# Patient Record
Sex: MALE | Race: WHITE
[De-identification: names, ages, dates, MRNs, and addresses within clinical notes are randomized per-mention and may not be internally consistent; named-entity substitution may affect disease eponyms.]

---

## 2019-09-15 ENCOUNTER — HOSPITAL ENCOUNTER (EMERGENCY)
Dept: HOSPITAL 56 - MW.ED | Age: 59
Discharge: HOME | End: 2019-09-15
Payer: COMMERCIAL

## 2019-09-15 DIAGNOSIS — S61.412A: Primary | ICD-10-CM

## 2019-09-15 DIAGNOSIS — Z79.899: ICD-10-CM
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
## 2019-09-15 DIAGNOSIS — I10: ICD-10-CM

## 2019-09-15 DIAGNOSIS — Z23: ICD-10-CM

## 2019-09-15 DIAGNOSIS — Z79.82: ICD-10-CM

## 2019-09-15 PROCEDURE — 90715 TDAP VACCINE 7 YRS/> IM: CPT

## 2019-09-15 PROCEDURE — 99282 EMERGENCY DEPT VISIT SF MDM: CPT

## 2019-09-15 PROCEDURE — 90471 IMMUNIZATION ADMIN: CPT

## 2019-09-15 NOTE — EDM.PDOC
ED HPI GENERAL MEDICAL PROBLEM





- General


Chief Complaint: Laceration


Stated Complaint: CUT ON HAND


Time Seen by Provider: 09/15/19 10:46


Source of Information: Reports: Patient


History Limitations: Reports: No Limitations





- History of Present Illness


INITIAL COMMENTS - FREE TEXT/NARRATIVE: 


History of present illness:


[]Patient cut his left hand with a  hour prior to arrival.





Review of systems: 


As per history of present illness and below otherwise all systems reviewed and 

negative.





Past medical history: 


As per history of present illness and as reviewed below otherwise 

noncontributory.





Surgical history: 


As per history of present illness and as reviewed below otherwise 

noncontributory.





Social history: 


No reported history of drug or alcohol abuse.





Family history: 


As per history of present illness and as reviewed below otherwise 

noncontributory.





Physical exam:


General: Well developed, well nourished in NAD


HEENT: Atraumatic, normocephalic, pupils reactive, negative for conjunctival 

pallor or scleral icterus, mucous membranes moist, throat clear, neck supple, 

nontender, trachea midline.


Lungs: Clear to auscultation, breath sounds equal bilaterally, chest nontender.


Heart: S1S2, regular, negative for clicks, rubs, or JVD.


Abdomen: NABS, Soft, nondistended, nontender. Negative for masses or 

hepatosplenomegaly. Negative for costovertebral tenderness.


Pelvis: Stable nontender.


Genitourinary: Deferred.


Rectal: Deferred.


Extremities: 5 cm laceration to the thenar eminence left hand, negative for 

cords or calf pain. Neurovascular unremarkable.


Neuro: Awake, alert, oriented. Cranial nerves II through XII unremarkable. 

Cerebellum unremarkable. Motor and sensory unremarkable throughout. Exam 

nonfocal.


Skin:warm and dry





Diagnostics:


None





Therapeutics:


Tetanus





ED Course:


Stable





Impression: 


Left hand laceration





Prescriptions:


None





Plan:


Take meds as directed, follow up with your primary care physician, return to ER 

if symptoms worsen or change.





Definitive disposition and diagnosis as appropriate pending reevaluation and 

review of above.





  ** Left hand


Pain Score (Numeric/FACES): 1





- Related Data


 Allergies











Allergy/AdvReac Type Severity Reaction Status Date / Time


 


No Known Allergies Allergy   Verified 09/15/19 10:55











Home Meds: 


 Home Meds





Aspirin 81 mg PO DAILY 09/15/19 [History]


Carvedilol [Coreg] 12.5 mg PO BID 09/15/19 [History]


Hydrochlorothiazide [Microzide] 12.5 mg PO DAILY 09/15/19 [History]


Omeprazole Magnesium [Prilosec Otc] 1 tab PO DAILY 09/15/19 [History]


Pravastatin Sodium [Pravachol] 80 mg PO DAILY 09/15/19 [History]


Ramipril [Altace] 10 mg PO DAILY 09/15/19 [History]











Past Medical History


Cardiovascular History: Reports: Bypass, Hypertension


Other Cardiovascular History: Patient states he has heart disease


Other Genitourinary History: Patient states he has "prostate issues."





- Infectious Disease History


Infectious Disease History: Reports: Chicken Pox





Social & Family History





- Family History


Family Medical History: Noncontributory





- Tobacco Use


Smoking Status *Q: Never Smoker





- Caffeine Use


Caffeine Use: Reports: None





- Recreational Drug Use


Recreational Drug Use: No





ED ROS GENERAL





- Review of Systems


Review Of Systems: See Below





ED EXAM, SKIN/RASH


Exam: See Below





ED SKIN PROCEDURES





- Laceration/Wound Repair


  ** Hand


Appearance: Superficial, Subcutaneous


Anesthetic Type: Local


Local Anesthesia - Lidocaine (Xylocaine): 1% Plain


Local Anesthesia - Bupivicaine (Marcaine): 0.5% Plain


Local Anesthetic Volume: 3cc


Skin Prep: Chlorhexidine (Hibiciens)


Lac/Wound length In cm: 5


Drain Placement: No


Sterile Dressing Applied: Nurse


Tetanus Status Addressed: Yes


Complications: No





Course





- Vital Signs


Last Recorded V/S: 





 Last Vital Signs











Temp  98.1 F   09/15/19 11:01


 


Pulse  62   09/15/19 11:01


 


Resp  15   09/15/19 11:01


 


BP  118/82   09/15/19 11:01


 


Pulse Ox  98   09/15/19 11:01














- Orders/Labs/Meds


Orders: 





 Active Orders 24 hr











 Category Date Time Status


 


 Vaccines to be Administered [RC] PER UNIT ROUTINE Care  09/15/19 11:11 Active











Meds: 





Medications














Discontinued Medications














Generic Name Dose Route Start Last Admin





  Trade Name Freq  PRN Reason Stop Dose Admin


 


Bupivacaine HCl  10 ml  09/15/19 10:49  





  Sensorcaine-Mpf 0.5%  INJECT  09/15/19 10:50  





  ONETIME ONE   





     





     





     





     


 


Diphtheria/Tetanus/Acell Pertussis  0.5 ml  09/15/19 11:11  





  Adacel  IM  09/15/19 11:12  





  .ONCE ONE   





     





     





     





     


 


Lidocaine HCl  5 ml  09/15/19 10:49  





  Xylocaine-Mpf 1%  INJECT  09/15/19 10:50  





  ONETIME ONE   





     





     





     





     














Departure





- Departure


Time of Disposition: 11:44


Disposition: Home, Self-Care 01


Condition: Good


Clinical Impression: 


Hand laceration


Qualifiers:


 Encounter type: initial encounter Foreign body presence: without foreign body 

Laterality: left Qualified Code(s): S61.412A - Laceration without foreign body 

of left hand, initial encounter








- Discharge Information


*PRESCRIPTION DRUG MONITORING PROGRAM REVIEWED*: Not Applicable


*COPY OF PRESCRIPTION DRUG MONITORING REPORT IN PATIENT COLIN: Not Applicable


Referrals: 


PCP,Unknown [Primary Care Provider] - 


Additional Instructions: 


The following information is given to patients seen in the emergency department 

who are being discharged to home. This information is to outline your options 

for follow-up care. We provide all patients seen in our emergency department 

with a follow-up referral.





The need for follow-up, as well as the timing and circumstances, are variable 

depending upon the specifics of your emergency department visit.





If you don't have a primary care physician on staff, we will provide you with a 

referral. We always advise you to contact your personal physician following an 

emergency department visit to inform them of the circumstance of the visit and 

for follow-up with them and/or the need for any referrals to a consulting 

specialist.





The emergency department will also refer you to a specialist when appropriate. 

This referral assures that you have the opportunity for follow-up care with a 

specialist. All of these measure are taken in an effort to provide you with 

optimal care, which includes your follow-up.





Under all circumstances we always encourage you to contact your private 

physician who remains a resource for coordinating your care. When calling for 

follow-up care, please make the office aware that this follow-up is from your 

recent emergency room visit. If for any reason you are refused follow-up, 

please contact the [facility] Emergency 

Department at [phone] and asked to speak to the emergency department 

charge nurse.





Take meds as directed, follow up with your primary care physician, return to ER 

if symptoms worsen or change.





[facility]


Primary Care


[address] [place] [id number]


Phone: [phone]


Fax: [phone]





- My Orders


Last 24 Hours: 





My Active Orders





09/15/19 11:11


Vaccines to be Administered [RC] PER UNIT ROUTINE 














- Assessment/Plan


Last 24 Hours: 





My Active Orders





09/15/19 11:11


Vaccines to be Administered [RC] PER UNIT ROUTINE

## 2019-11-21 ENCOUNTER — HOSPITAL ENCOUNTER (OUTPATIENT)
Dept: HOSPITAL 56 - MW.ED | Age: 59
Setting detail: OBSERVATION
LOS: 2 days | Discharge: HOME | End: 2019-11-23
Attending: SURGERY | Admitting: SURGERY
Payer: COMMERCIAL

## 2019-11-21 DIAGNOSIS — Z79.82: ICD-10-CM

## 2019-11-21 DIAGNOSIS — K35.80: Primary | ICD-10-CM

## 2019-11-21 DIAGNOSIS — Z87.891: ICD-10-CM

## 2019-11-21 DIAGNOSIS — K21.9: ICD-10-CM

## 2019-11-21 DIAGNOSIS — Z79.899: ICD-10-CM

## 2019-11-21 LAB
BUN SERPL-MCNC: 9 MG/DL (ref 7–18)
CHLORIDE SERPL-SCNC: 101 MMOL/L (ref 98–107)
CO2 SERPL-SCNC: 28 MMOL/L (ref 21–32)
GLUCOSE SERPL-MCNC: 116 MG/DL (ref 74–106)
LIPASE SERPL-CCNC: 101 U/L (ref 73–393)
POTASSIUM SERPL-SCNC: 3.5 MMOL/L (ref 3.5–5.1)
SODIUM SERPL-SCNC: 137 MMOL/L (ref 136–148)

## 2019-11-21 PROCEDURE — C1776 JOINT DEVICE (IMPLANTABLE): HCPCS

## 2019-11-21 PROCEDURE — 74178 CT ABD&PLV WO CNTR FLWD CNTR: CPT

## 2019-11-21 PROCEDURE — 84484 ASSAY OF TROPONIN QUANT: CPT

## 2019-11-21 PROCEDURE — 83690 ASSAY OF LIPASE: CPT

## 2019-11-21 PROCEDURE — 93005 ELECTROCARDIOGRAM TRACING: CPT

## 2019-11-21 PROCEDURE — 96374 THER/PROPH/DIAG INJ IV PUSH: CPT

## 2019-11-21 PROCEDURE — 44970 LAPAROSCOPY APPENDECTOMY: CPT

## 2019-11-21 PROCEDURE — 81001 URINALYSIS AUTO W/SCOPE: CPT

## 2019-11-21 PROCEDURE — 83880 ASSAY OF NATRIURETIC PEPTIDE: CPT

## 2019-11-21 PROCEDURE — 85025 COMPLETE CBC W/AUTO DIFF WBC: CPT

## 2019-11-21 PROCEDURE — 80053 COMPREHEN METABOLIC PANEL: CPT

## 2019-11-21 PROCEDURE — 71045 X-RAY EXAM CHEST 1 VIEW: CPT

## 2019-11-21 PROCEDURE — 96375 TX/PRO/DX INJ NEW DRUG ADDON: CPT

## 2019-11-21 PROCEDURE — 99285 EMERGENCY DEPT VISIT HI MDM: CPT

## 2019-11-21 NOTE — EDM.PDOC
ED HPI GENERAL MEDICAL PROBLEM





- General


Chief Complaint: Abdominal Pain


Stated Complaint: SEVERE ABDOMINAL PAIN


Time Seen by Provider: 11/21/19 22:12


Source of Information: Reports: Patient





- History of Present Illness


INITIAL COMMENTS - FREE TEXT/NARRATIVE: 





HISTORY AND PHYSICAL:


History of present illness:


[Shunt presents with right lower quadrant pain for 12-24 hours increasing in 

severity]


Review of systems: 


As per history of present illness and below otherwise all systems reviewed and 

negative.


Past medical history: 


As per history of present illness and as reviewed below otherwise 

noncontributory.


Surgical history: 


As per history of present illness and as reviewed below otherwise 

noncontributory.


Social history: 


No reported history of drug or alcohol abuse.


Family history: 


As per history of present illness and as reviewed below otherwise 

noncontributory.


Physical exam:


HEENT: Atraumatic, normocephalic, pupils reactive, negative for conjunctival 

pallor or scleral icterus, mucous membranes moist, throat clear, neck supple, 

nontender, trachea midline.


Lungs: Clear to auscultation, breath sounds equal bilaterally, chest nontender.


Heart: S1S2, regular, negative for clicks, rubs, or JVD.


Abdomen: Soft, guarding with rebound right lower quadrant. Negative for masses 

or hepatosplenomegaly. Negative for costovertebral tenderness.


Pelvis: Stable nontender.


Genitourinary: Deferred.


Rectal: Deferred.


Extremities: Atraumatic, negative for cords or calf pain. Neurovascular 

unremarkable.


Neuro: Awake, alert, oriented. Cranial nerves II through XII unremarkable. 

Cerebellum unremarkable. Motor and sensory unremarkable throughout. Exam 

nonfocal.


Diagnostics:


[EBC CMP UA troponin


EKG CT abdomen pelvis with and without contrast


]


Therapeutics:


[Normal saline


Zofran


Morphine


Rocephin


]


Impression: 


[ acute appendicitis ]


Definitive disposition and diagnosis as appropriate pending reevaluation and 

review of above.


  ** Abdomen


Pain Score (Numeric/FACES): 5





- Related Data


 Allergies











Allergy/AdvReac Type Severity Reaction Status Date / Time


 


No Known Allergies Allergy   Verified 11/21/19 22:17











Home Meds: 


 Home Meds





Aspirin 81 mg PO DAILY 09/15/19 [History]


Hydrochlorothiazide [Microzide] 12.5 mg PO DAILY 09/15/19 [History]


Ramipril [Altace] 10 mg PO DAILY 09/15/19 [History]


carvediloL [Coreg] 12.5 mg PO BID 09/15/19 [History]


Finasteride 1 mg PO DAILY 11/21/19 [History]


Omeprazole Magnesium [Prilosec] 10 mg PO DAILY 11/21/19 [History]


Pravastatin [Pravachol] 80 mg PO DAILY 11/21/19 [History]











Past Medical History


Cardiovascular History: Reports: Bypass, Hypertension


Other Cardiovascular History: Patient states he has heart disease


Other Genitourinary History: Patient states he has "prostate issues."





- Infectious Disease History


Infectious Disease History: Reports: Chicken Pox





Social & Family History





- Family History


Family Medical History: Noncontributory





- Caffeine Use


Caffeine Use: Reports: None





ED ROS GENERAL





- Review of Systems


Review Of Systems: See Below





ED EXAM, GENERAL





- Physical Exam


Exam: See Below





Course





- Vital Signs


Last Recorded V/S: 


 Last Vital Signs











Temp  97.8 F   11/22/19 00:03


 


Pulse  70   11/22/19 00:03


 


Resp  18   11/22/19 00:03


 


BP  109/61   11/22/19 00:03


 


Pulse Ox  95   11/22/19 00:03














- Orders/Labs/Meds


Orders: 


 Active Orders 24 hr











 Category Date Time Status


 


 EKG Documentation Completion [RC] STAT Care  11/21/19 22:12 Active











Labs: 


 Laboratory Tests











  11/21/19 11/21/19 11/21/19 Range/Units





  22:20 22:20 22:25 


 


WBC  17.15 H    (4.0-11.0)  K/uL


 


RBC  4.67    (4.50-5.90)  M/uL


 


Hgb  15.5    (13.0-17.0)  g/dL


 


Hct  44.3    (38.0-50.0)  %


 


MCV  94.9    (80.0-98.0)  fL


 


MCH  33.2 H    (27.0-32.0)  pg


 


MCHC  35.0    (31.0-37.0)  g/dL


 


RDW Std Deviation  45.1    (28.0-62.0)  fl


 


RDW Coeff of Dior  13    (11.0-15.0)  %


 


Plt Count  158    (150-400)  K/uL


 


MPV  10.00    (7.40-12.00)  fL


 


Neut % (Auto)  78.0    (48.0-80.0)  %


 


Lymph % (Auto)  9.4 L    (16.0-40.0)  %


 


Mono % (Auto)  11.7    (0.0-15.0)  %


 


Eos % (Auto)  0.8    (0.0-7.0)  %


 


Baso % (Auto)  0.1    (0.0-1.5)  %


 


Neut # (Auto)  13.4 H    (1.4-5.7)  K/uL


 


Lymph # (Auto)  1.6    (0.6-2.4)  K/uL


 


Mono # (Auto)  2.0 H    (0.0-0.8)  K/uL


 


Eos # (Auto)  0.1    (0.0-0.7)  K/uL


 


Baso # (Auto)  0.0    (0.0-0.1)  K/uL


 


Nucleated RBC %  0.0    /100WBC


 


Nucleated RBCs #  0    K/uL


 


Sodium   137   (136-148)  mmol/L


 


Potassium   3.5   (3.5-5.1)  mmol/L


 


Chloride   101   ()  mmol/L


 


Carbon Dioxide   28.0   (21.0-32.0)  mmol/L


 


BUN   9   (7.0-18.0)  mg/dL


 


Creatinine   1.1   (0.8-1.3)  mg/dL


 


Est Cr Clr Drug Dosing   66.77   mL/min


 


Estimated GFR (MDRD)   > 60.0   ml/min


 


Glucose   116 H   ()  mg/dL


 


Calcium   8.8   (8.5-10.1)  mg/dL


 


Total Bilirubin   1.4 H   (0.2-1.0)  mg/dL


 


AST   20   (15-37)  IU/L


 


ALT   42   (14-63)  IU/L


 


Alkaline Phosphatase   63   ()  U/L


 


Troponin I   < 0.050   (0.000-0.056)  ng/mL


 


Total Protein   7.5   (6.4-8.2)  g/dL


 


Albumin   4.0   (3.4-5.0)  g/dL


 


Globulin   3.5   (2.6-4.0)  g/dL


 


Albumin/Globulin Ratio   1.1   (0.9-1.6)  


 


Lipase   101   ()  U/L


 


Urine Color    YELLOW  


 


Urine Appearance    CLEAR  


 


Urine pH    7.0  (5.0-8.0)  


 


Ur Specific Gravity    1.010  (1.001-1.035)  


 


Urine Protein    NEGATIVE  (NEGATIVE)  mg/dL


 


Urine Glucose (UA)    NEGATIVE  (NEGATIVE)  mg/dL


 


Urine Ketones    NEGATIVE  (NEGATIVE)  mg/dL


 


Urine Occult Blood    TRACE-INTACT H  (NEGATIVE)  


 


Urine Nitrite    NEGATIVE  (NEGATIVE)  


 


Urine Bilirubin    NEGATIVE  (NEGATIVE)  


 


Urine Urobilinogen    1.0  (<2.0)  EU/dL


 


Ur Leukocyte Esterase    NEGATIVE  (NEGATIVE)  


 


Urine RBC    0-2  (0-2/HPF)  


 


Urine WBC    0-1  (0-5/HPF)  


 


Ur Epithelial Cells    RARE  (NONE-FEW)  


 


Urine Bacteria    RARE  (NEGATIVE)  











Meds: 


Medications














Discontinued Medications














Generic Name Dose Route Start Last Admin





  Trade Name Freq  PRN Reason Stop Dose Admin


 


Sodium Chloride  1,000 mls @ 999 mls/hr  11/21/19 22:11  11/21/19 22:31





  Normal Saline  IV  11/21/19 23:11  999 mls/hr





  STAT ONE   Administration





     





     





     





     


 


Iopamidol  100 ml  11/21/19 23:16  11/21/19 23:17





  Isovue Multipack-370 (76%)  IVPUSH  11/21/19 23:17  100 ml





  ONETIME STA   Administration





     





     





     





     


 


Morphine Sulfate  2 mg  11/21/19 22:41  11/21/19 22:49





  Morphine  IVPUSH  11/21/19 22:42  2 mg





  ONETIME ONE   Administration





     





     





     





     


 


Ondansetron HCl  8 mg  11/21/19 22:11  11/21/19 22:31





  Zofran  IVPUSH  11/21/19 22:12  8 mg





  ONETIME ONE   Administration





     





     





     





     














Departure





- Departure


Time of Disposition: 00:11


Disposition: Refer to Observation


Condition: Poor


Clinical Impression: 


 Appendicitis








- Discharge Information


Forms:  ED Department Discharge





- My Orders


Last 24 Hours: 


My Active Orders





11/21/19 22:12


EKG Documentation Completion [RC] STAT 














- Assessment/Plan


Last 24 Hours: 


My Active Orders





11/21/19 22:12


EKG Documentation Completion [RC] STAT

## 2019-11-21 NOTE — CT
INDICATION:



Right lower quadrant pain 



TECHNIQUE:



CT abdomen and pelvis acquired with IV contrast. 100 cc Isovue 370 



COMPARISON:



None 



FINDINGS:



Lower chest: Unremarkable.  



Liver: Unremarkable.  



Spleen: Unremarkable.  



Pancreas: Unremarkable.  



Gallbladder and bile ducts: Unremarkable.  



Kidneys: Unremarkable.  



Adrenal glands: Unremarkable.  



GI tract: Unremarkable. The appendix is dilated up to 1.7 centimeters, 

thick-walled and contains multiple appendicoliths. Significant adjacent 

inflammatory stranding. Findings consistent with acute appendicitis. 



Vascular structures: Unremarkable.  



Lymph nodes: Unremarkable.  



Miscellaneous: Unremarkable.  No free air or significant free fluid.  



Pelvic Organs: Unremarkable.  



Bones: Unremarkable for age.  



IMPRESSION:



Dilated and thickened appendix with adjacent inflammatory stranding and 

multiple appendicoliths consistent with acute appendicitis.



Dictated by Favian Beach MD @ 11/21/2019 11:51:12 PM



Please note that all CT scans at this facility use dose modulation, 

iterative reconstruction, and/or weight-based dosing when appropriate to 

reduce radiation dose to as low as reasonably achievable.



Dictated by: Favian Beach MD @ 11/21/2019 23:51:18



(Electronically Signed)

## 2019-11-22 RX ADMIN — OXYCODONE HYDROCHLORIDE AND ACETAMINOPHEN PRN TAB: 10; 325 TABLET ORAL at 22:01

## 2019-11-22 RX ADMIN — ONDANSETRON PRN MG: 2 INJECTION, SOLUTION INTRAMUSCULAR; INTRAVENOUS at 05:45

## 2019-11-22 RX ADMIN — OXYCODONE HYDROCHLORIDE AND ACETAMINOPHEN PRN TAB: 10; 325 TABLET ORAL at 15:56

## 2019-11-22 RX ADMIN — ONDANSETRON PRN MG: 2 INJECTION, SOLUTION INTRAMUSCULAR; INTRAVENOUS at 15:57

## 2019-11-22 NOTE — CR
HISTORY:



Possible CHF. 



COMPARISON:



None available. 



FINDINGS:



A portable erect AP view of the chest was obtained at STUDY TIME hours. 



The lungs are clear. No focal or diffuse infiltrates are present. 



The heart is top normal in size. 



There are sternal wires from median sternotomy. 



There is fullness of the retrocardiac region suggesting a moderate sized 

hiatal hernia. 



The mediastinum is otherwise normal in appearance. 



The osseous structures are normal in appearance for the patient`s age. 



IMPRESSION:



No active disease seen in the chest.



Probable moderate-sized hiatal hernia.



Dictated by Vladislav Akers MD @ Nov 22 2019  4:09AM



Signed by Dr. Vladislav Akers @ Nov 22 2019  4:10AM

## 2019-11-22 NOTE — OR
SURGEON:

Huan Gil MD

 

DATE OF PROCEDURE:  11/22/2019

 

PREOPERATIVE DIAGNOSIS:

Acute appendicitis.

 

POSTOPERATIVE DIAGNOSIS:

Acute appendicitis.

 

PROCEDURE PERFORMED:

Laparoscopic appendectomy.

 

PRIMARY SURGEON:

Huan Gil MD.

 

COMPLICATIONS:

None.

 

FINDINGS:

Appendix is dilated to 17 mm, most of the dilatation is proximally.  Involved

the proximal one-half of the appendix and the appendix itself is dilated and not

much exudate.  Gross perforation is not observed.  Peritoneal fluid is clear.

Although the appendix may have some inflammation on the surface, the appendix

maybe called mild suppurative appendicitis, not otherwise suppurative.  As a

result, a NORBERT drain was put, and the NORBERT drain should stay at least 3 days after

oral diet.  The patient has significant cardiac history.  Consult hospitalists

for med management postoperatively.

 

DESCRIPTION OF PROCEDURE:

The patient was taken to the operating room and placed in the supine position.

Following induction of general endotracheal anesthesia, the patient's abdomen

was prepped and draped in the sterile fashion.  A time-out has been called.  The

patient was identified.  The procedure was identified.  The antibiotics were

identified.  The procedure then proceeded.

 

The abdomen was prepped and draped in a standard fashion.  After assessment of

appropriate landmarks, a 12 millimeter trocar was inserted supraumbilically

using Optiview and pneumoperitoneum was then achieved.  This was followed with

placement of 5 millimeter port in the right upper quadrant and another 5

millimeter port infraumbilically.  The camera was inserted supraumbilical site

and two laparoscopic Fabrizio retractors were then inserted through the other two

sites.  Following the cecum, the appendix was located.  The appendix was then

lifted up, and using a GI stapler the appendix was amputated at the base.  And

using the GI stapler, the mesoappendix was then amputated.  The appendix was

retrieved by an endoscopic bag and sent for pathologist.

 

This was then followed by re-insertion of the camera to examine the staple line,

and hemostasis.  The trocars were then removed.

 

The umbilical site was closed with 2-0 Vicryl deep stitch and 4-0 Vicryl and

Dermabond; the other 2 5 mm port sites were closed with 4-0 Vicryl and

Dermabond.

 

The patient was then awakened, extubated, and transferred to the recovery room

in hemodynamically stable condition.  Prior to closing, sponge count and

instrument count was correct.

 

Intraoperative findings as dictated above.  A piece of Surgicel was inserted for

hemostasis and the trocar site was closed with 2-0 Vicryl on the umbilical site

and the other two sites and the skin approximated by use of skin staple followed

with appropriate dressing and a flat 7 NORBERT drain was inserted on the lower part,

anchored to the skin with 0 silk.

 

Dr. Gil was present throughout the whole procedure.

 

As always, thank you for the kind referral.

 

 

TYLER MAGALLON

DD:  11/22/2019 05:48:10

DT:  11/22/2019 10:12:54

Job #:  [id number]

## 2019-11-22 NOTE — PCM.POSTAN
POST ANESTHESIA ASSESSMENT





- MENTAL STATUS


Mental Status: Alert, Oriented





- VITAL SIGNS


Vital Signs: 


 Last Vital Signs











Temp  35.4 C   11/22/19 06:40


 


Pulse  64   11/22/19 06:55


 


Resp  16   11/22/19 06:55


 


BP  156/80 H  11/22/19 06:55


 


Pulse Ox  95   11/22/19 06:55














- RESPIRATORY


Respiratory Status: Respiratory Rate WNL, Airway Patent, O2 Saturation Stable





- CARDIOVASCULAR


CV Status: Pulse Rate WNL, Blood Pressure Stable





- GASTROINTESTINAL


GI Status: No Symptoms





- PAIN


Pain Score: 0





- POST OP HYDRATION


Hydration Status: Adequate & Stable





- OBSERVATIONS


Free Text/Narrative:: 





No anesthesia problems

## 2019-11-22 NOTE — CONS
DATE OF CONSULTATION:

2019

 

YOB: 1960

 

PRIMARY CARE PHYSICIAN:

LEANNE BARCLAY MD

 

Consult from Dr. Galvan in the emergency room.

 

REASON FOR CONSULTATION:

Acute appendicitis.

 

HISTORY OF PRESENT ILLNESS:

The patient is 59 years old  gentleman complaining of a 48-hour history

of acute onset of right lower quadrant pain.  Pain initially from the

periumbilical, now migrated to the right lower quadrant and pain is enough to

drive him to the emergency room where he got a CAT scan that shows a dilated

appendix about 17 mm, no perforation yet.  Thickened wall, stranding of fat

consistent with acute appendicitis.  Surgery was called.  The patient remarked

that he had something to eat or drink around 6 hours ago.

 

ALLERGIES:

Please refer to nursing for details.

 

MEDICATION:

Please refer to nursing for details.

 

PAST MEDICAL HISTORY:

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  The patient also recently

seen Cardiology, got a workup.  Stress test was negative and perfusion test

shows ejection fraction of 35% with some area of constant hypokinetic consistent

with old infarct.

 

SURGERIES:

CABG in the past and virgin abdomen.

 

SOCIAL HISTORY:

The patient denied tobacco and alcohol abuse.

 

FAMILY HISTORY:

Noncontributory.

 

PHYSICAL EXAMINATION:

GENERAL:  A very pleasant gentleman, smiled to the doctor, in no acute distress.

HEENT:  Normocephalic and atraumatic.  Sclerae anicteric.

LUNGS:  Clear to auscultation.

HEART:  Regular rate and rhythm.

ABDOMEN:  Soft, nondistended.  No pulsating tender midline abdominal structure.

Exquisite tenderness at McBurney point.  No rebound tenderness.  No Rovsing

sign.

 

LABORATORY DATA:

White count is 17,000.

 

CAT scan as alluded above.

 

IMPRESSION:

Acute appendicitis, probably will rupture at any time as the patient has been

hurting for 48 hours.  Acute appendicitis usually rupture in 72 hours and has

been told and the appendix is dilated to 17 mm.  At the time of calling for

surgery, unfortunate  is ongoing.  Will be on line for the  to

finish .  We will start the IV fluid at 125, lactated Ringer and the patient

would have a dose of Rocephin and will get another dose of Mefoxin if the

surgery is going to be 2 to 3 hours later.  All the plan has been discussed with

the patient, patient concurred to proceed as planned.

 

 

As always, thank you for the kind referral.

 

 

LIANS / MODL

DD:  2019 01:00:55

DT:  2019 03:49:44

Job #:  [id number]

SUAD

## 2019-11-22 NOTE — PCM.SN
- Free Text/Narrative


Note: 


pod#0 lap appendectomy, hemodynamically stable, admitted to telemetry for 

cardvasc risks. Denied chest pain/SOB, on clear liquid diet; will keep in 

telemetry for now; consult hospitalist for med management because of sig. card 

hx. NORBERT stays till Wednesday; I will be out of town after Saturday, my oncall 

partner will dc drain next week.  pt aware of the situation.

## 2019-11-22 NOTE — PCM.CONS
H&P History of Present Illness





- General


Date of Service: 11/22/19


Admit Problem/Dx: 


 Admission Diagnosis/Problem





Admission Diagnosis/Problem      Appendicitis











- History of Present Illness


Initial Comments - Free Text/Narative: 





58 yo male who is being admitted following an appendectomy for further 

monitoring due to his history of coronary heart disease.  He had an MI 10 years 

ago and has had a CABG.  He reports this year he had a negative stress test.  

His last Echocardiogram in May reported an EF of 40-45% with apical thinning 

and hypokenesis.  He reports getting chest pain about once a month for which he 

takes an extra aspirin for.


  ** Abdomen


Pain Score (Numeric/FACES): 5





- Related Data


Allergies/Adverse Reactions: 


 Allergies











Allergy/AdvReac Type Severity Reaction Status Date / Time


 


No Known Allergies Allergy   Verified 11/22/19 03:12











Home Medications: 


 Home Meds





Aspirin 81 mg PO DAILY 09/15/19 [History]


Hydrochlorothiazide [Microzide] 12.5 mg PO DAILY 09/15/19 [History]


Ramipril [Altace] 10 mg PO DAILY 09/15/19 [History]


carvediloL [Coreg] 12.5 mg PO BID 09/15/19 [History]


Finasteride 5 mg PO DAILY 11/21/19 [History]


Pravastatin [Pravachol] 80 mg PO DAILY 11/21/19 [History]


Multivitamin [One Daily Multivitamin] 1 tab PO DAILY 11/22/19 [History]


Omeprazole Magnesium [Prilosec Otc] 20 mg PO DAILY 11/22/19 [History]


Ubidecarenone [Ultra Coq10]  11/22/19 [History]











Past Medical History


HEENT History: Reports: None


Cardiovascular History: Reports: Bypass, Cardiomyopathy, Heart Failure (EF 35-40

%, apex,base,inferior walls severe hypokinesia/akinesia 5/19 BNP-58 11/19), 

Hypertension


Other Cardiovascular History: Patient states he has heart disease


Respiratory History: Reports: None


Gastrointestinal History: Reports: GERD, Hiatal Hernia (Confirmed on CXR 11/19)


Genitourinary History: Reports: None


Other Genitourinary History: Patient states he has "prostate issues."


Musculoskeletal History: Reports: None


Neurological History: Reports: None


Psychiatric History: Reports: None


Endocrine/Metabolic History: Reports: None


Insulin Pump Model and : None


Hematologic History: Reports: None


Immunologic History: Reports: None


Oncologic (Cancer) History: Reports: None


Dermatologic History: Reports: None





- Infectious Disease History


Infectious Disease History: Reports: Chicken Pox





- Past Surgical History


Head Surgeries/Procedures: Reports: None


Cardiovascular Surgical History: Reports: Coronary Artery Bypass (x5 2009)


Other Cardiovascular Surgeries/Procedures: Angio 2009 without stent placement





Social & Family History





- Family History


Family Medical History: Noncontributory





- Tobacco Use


Smoking Status *Q: Former Smoker


Used Tobacco, but Quit: Yes


Month/Year Tobacco Last Used: 2009





- Caffeine Use


Caffeine Use: Reports: Soda





- Recreational Drug Use


Recreational Drug Use: No





H&P Review of Systems





- Review of Systems:


Review Of Systems: Comprehensive ROS is negative, except as noted in HPI.





Exam





- Exam


Exam: See Below





- Vital Signs


Vital Signs: 


 Last Vital Signs











Temp  36.6 C   11/22/19 10:24


 


Pulse  73   11/22/19 10:24


 


Resp  18   11/22/19 10:24


 


BP  134/80   11/22/19 10:24


 


Pulse Ox  93 L  11/22/19 10:24











Weight: 80 kg





- Exam


General: Alert, Oriented


HEENT: Mucosa Moist & Pink


Lungs: Clear to Auscultation, Normal Respiratory Effort


Cardiovascular: Regular Rate, Regular Rhythm


Extremities: Non-Tender, No Pedal Edema


Skin: Warm, Dry, Intact





- Patient Data


Lab Results Last 24 hrs: 


 Laboratory Results - last 24 hr











  11/21/19 11/21/19 11/21/19 Range/Units





  22:20 22:20 22:20 


 


WBC  17.15 H    (4.0-11.0)  K/uL


 


RBC  4.67    (4.50-5.90)  M/uL


 


Hgb  15.5    (13.0-17.0)  g/dL


 


Hct  44.3    (38.0-50.0)  %


 


MCV  94.9    (80.0-98.0)  fL


 


MCH  33.2 H    (27.0-32.0)  pg


 


MCHC  35.0    (31.0-37.0)  g/dL


 


RDW Std Deviation  45.1    (28.0-62.0)  fl


 


RDW Coeff of Dior  13    (11.0-15.0)  %


 


Plt Count  158    (150-400)  K/uL


 


MPV  10.00    (7.40-12.00)  fL


 


Neut % (Auto)  78.0    (48.0-80.0)  %


 


Lymph % (Auto)  9.4 L    (16.0-40.0)  %


 


Mono % (Auto)  11.7    (0.0-15.0)  %


 


Eos % (Auto)  0.8    (0.0-7.0)  %


 


Baso % (Auto)  0.1    (0.0-1.5)  %


 


Neut # (Auto)  13.4 H    (1.4-5.7)  K/uL


 


Lymph # (Auto)  1.6    (0.6-2.4)  K/uL


 


Mono # (Auto)  2.0 H    (0.0-0.8)  K/uL


 


Eos # (Auto)  0.1    (0.0-0.7)  K/uL


 


Baso # (Auto)  0.0    (0.0-0.1)  K/uL


 


Nucleated RBC %  0.0    /100WBC


 


Nucleated RBCs #  0    K/uL


 


Sodium   137   (136-148)  mmol/L


 


Potassium   3.5   (3.5-5.1)  mmol/L


 


Chloride   101   ()  mmol/L


 


Carbon Dioxide   28.0   (21.0-32.0)  mmol/L


 


BUN   9   (7.0-18.0)  mg/dL


 


Creatinine   1.1   (0.8-1.3)  mg/dL


 


Est Cr Clr Drug Dosing   66.77   mL/min


 


Estimated GFR (MDRD)   > 60.0   ml/min


 


Glucose   116 H   ()  mg/dL


 


Calcium   8.8   (8.5-10.1)  mg/dL


 


Total Bilirubin   1.4 H   (0.2-1.0)  mg/dL


 


AST   20   (15-37)  IU/L


 


ALT   42   (14-63)  IU/L


 


Alkaline Phosphatase   63   ()  U/L


 


Troponin I   < 0.050   (0.000-0.056)  ng/mL


 


B-Natriuretic Peptide    59  (<100)  PG/ML


 


Total Protein   7.5   (6.4-8.2)  g/dL


 


Albumin   4.0   (3.4-5.0)  g/dL


 


Globulin   3.5   (2.6-4.0)  g/dL


 


Albumin/Globulin Ratio   1.1   (0.9-1.6)  


 


Lipase   101   ()  U/L


 


Urine Color     


 


Urine Appearance     


 


Urine pH     (5.0-8.0)  


 


Ur Specific Gravity     (1.001-1.035)  


 


Urine Protein     (NEGATIVE)  mg/dL


 


Urine Glucose (UA)     (NEGATIVE)  mg/dL


 


Urine Ketones     (NEGATIVE)  mg/dL


 


Urine Occult Blood     (NEGATIVE)  


 


Urine Nitrite     (NEGATIVE)  


 


Urine Bilirubin     (NEGATIVE)  


 


Urine Urobilinogen     (<2.0)  EU/dL


 


Ur Leukocyte Esterase     (NEGATIVE)  


 


Urine RBC     (0-2/HPF)  


 


Urine WBC     (0-5/HPF)  


 


Ur Epithelial Cells     (NONE-FEW)  


 


Urine Bacteria     (NEGATIVE)  














  11/21/19 Range/Units





  22:25 


 


WBC   (4.0-11.0)  K/uL


 


RBC   (4.50-5.90)  M/uL


 


Hgb   (13.0-17.0)  g/dL


 


Hct   (38.0-50.0)  %


 


MCV   (80.0-98.0)  fL


 


MCH   (27.0-32.0)  pg


 


MCHC   (31.0-37.0)  g/dL


 


RDW Std Deviation   (28.0-62.0)  fl


 


RDW Coeff of Dior   (11.0-15.0)  %


 


Plt Count   (150-400)  K/uL


 


MPV   (7.40-12.00)  fL


 


Neut % (Auto)   (48.0-80.0)  %


 


Lymph % (Auto)   (16.0-40.0)  %


 


Mono % (Auto)   (0.0-15.0)  %


 


Eos % (Auto)   (0.0-7.0)  %


 


Baso % (Auto)   (0.0-1.5)  %


 


Neut # (Auto)   (1.4-5.7)  K/uL


 


Lymph # (Auto)   (0.6-2.4)  K/uL


 


Mono # (Auto)   (0.0-0.8)  K/uL


 


Eos # (Auto)   (0.0-0.7)  K/uL


 


Baso # (Auto)   (0.0-0.1)  K/uL


 


Nucleated RBC %   /100WBC


 


Nucleated RBCs #   K/uL


 


Sodium   (136-148)  mmol/L


 


Potassium   (3.5-5.1)  mmol/L


 


Chloride   ()  mmol/L


 


Carbon Dioxide   (21.0-32.0)  mmol/L


 


BUN   (7.0-18.0)  mg/dL


 


Creatinine   (0.8-1.3)  mg/dL


 


Est Cr Clr Drug Dosing   mL/min


 


Estimated GFR (MDRD)   ml/min


 


Glucose   ()  mg/dL


 


Calcium   (8.5-10.1)  mg/dL


 


Total Bilirubin   (0.2-1.0)  mg/dL


 


AST   (15-37)  IU/L


 


ALT   (14-63)  IU/L


 


Alkaline Phosphatase   ()  U/L


 


Troponin I   (0.000-0.056)  ng/mL


 


B-Natriuretic Peptide   (<100)  PG/ML


 


Total Protein   (6.4-8.2)  g/dL


 


Albumin   (3.4-5.0)  g/dL


 


Globulin   (2.6-4.0)  g/dL


 


Albumin/Globulin Ratio   (0.9-1.6)  


 


Lipase   ()  U/L


 


Urine Color  YELLOW  


 


Urine Appearance  CLEAR  


 


Urine pH  7.0  (5.0-8.0)  


 


Ur Specific Gravity  1.010  (1.001-1.035)  


 


Urine Protein  NEGATIVE  (NEGATIVE)  mg/dL


 


Urine Glucose (UA)  NEGATIVE  (NEGATIVE)  mg/dL


 


Urine Ketones  NEGATIVE  (NEGATIVE)  mg/dL


 


Urine Occult Blood  TRACE-INTACT H  (NEGATIVE)  


 


Urine Nitrite  NEGATIVE  (NEGATIVE)  


 


Urine Bilirubin  NEGATIVE  (NEGATIVE)  


 


Urine Urobilinogen  1.0  (<2.0)  EU/dL


 


Ur Leukocyte Esterase  NEGATIVE  (NEGATIVE)  


 


Urine RBC  0-2  (0-2/HPF)  


 


Urine WBC  0-1  (0-5/HPF)  


 


Ur Epithelial Cells  RARE  (NONE-FEW)  


 


Urine Bacteria  RARE  (NEGATIVE)  











Result Diagrams: 


 11/21/19 22:20





 11/21/19 22:20





Consult PN Assessment/Plan


Procedures: 


Procedures





ASSAY OF FOLIC ACID SERUM (07/12/19)


ASSAY OF FREE TESTOSTERONE (07/12/19)


ASSAY OF INSULIN (07/12/19)


ASSAY OF TOTAL TESTOSTERONE (07/12/19)


ASSAY THYROID STIM HORMONE (07/12/19)


CARDIOVASCULAR STRESS TEST (07/16/19)


COMPLETE CBC W/AUTO DIFF WBC (07/12/19)


COMPREHEN METABOLIC PANEL (07/12/19)


EMERGENCY DEPT VISIT (09/15/19)


EXTRACRANIAL BILAT STUDY (09/03/19)


GLYCOSYLATED HEMOGLOBIN TEST (07/12/19)


HT MUSCLE IMAGE SPECT MULT (07/16/19)


IMMUNIZATION ADMIN (09/15/19)


IRON BINDING TEST (07/12/19)


LIPID PANEL (05/06/19)


METABOLIC PANEL TOTAL CA (05/06/19)


ROUTINE VENIPUNCTURE (05/06/19)


TDAP VACCINE 7 YRS/> IM (09/15/19)


TTE W/DOPPLER COMPLETE (05/09/19)


VITAMIN B-12 (07/12/19)








Problem List Initiated/Reviewed/Updated: Yes


My Orders Last 24 Hours: 


My Active Orders





11/22/19 21:00


carvediloL [Coreg]   12.5 mg PO BID 





11/23/19 09:00


Aspirin   81 mg PO DAILY 


Finasteride [Finasteride]   5 mg PO DAILY 


Pravastatin [Pravachol]   80 mg PO DAILY 


Ramipril [Altace]   10 mg PO DAILY 


hydroCHLOROthiazide   12.5 mg PO DAILY 











Plan: 





58 yo male admitted following an appendectomy by Dr. Gil.  I would recommend 

restarting his cardioprotective medications and insuring incentive spirometry 

use and DVT prophylaxis.

## 2019-11-22 NOTE — PCM.SN
- Free Text/Narrative


Note: 





pt seen, chart reviewed, acute appendicitis of pain >48 hrs, and size 17 mm; 

proceed to surgery; rb dw pt re bleeding/infection/damage to nearby organs/po 

course/possible drain placement; pt concurred and proceed.; admission h/p [id number]

## 2019-11-22 NOTE — [id number]
Post Anesthesia Note





- EVALUATION WITHIN 48HRS OF ANESTHETIC


Vital Signs in Normal Range: Yes


Patient Participated in Evaluation: Yes


Respiratory Function Stable: Yes


Airway Patent: Yes


Cardiovascular Function Stable: Yes


Hydration Status Stable: Yes


Pain Control Satisfactory: Yes (5/10 which he states is better than preop)


Nausea and Vomiting Control Satisfactory: Yes


Mental Status Recovered: Yes


Vital Signs: 


 Last Vital Signs











Temp  35.4 C   11/22/19 06:40


 


Pulse  64   11/22/19 06:55


 


Resp  16   11/22/19 06:55


 


BP  156/80 H  11/22/19 06:55


 


Pulse Ox  95   11/22/19 06:55

## 2019-11-22 NOTE — PCM.PREANE
Preanesthetic Assessment





- Anesthesia/Transfusion/Family Hx


Anesthesia History: Prior Anesthesia Without Reaction


Family History of Anesthesia Reaction: No


Transfusion History: No Prior Transfusion(s)





- Review of Systems


General: Weakness


Pulmonary: No Symptoms


Cardiovascular: No Symptoms


Gastrointestinal: Nausea


Neurological: No Symptoms


Other: Reports: None





- Physical Assessment


NPO Status Date: 11/21/19


NPO Status Time: 22:00


Vital Signs: 





 Last Vital Signs











Temp  98.3 F   11/22/19 03:50


 


Pulse  67   11/22/19 03:50


 


Resp  17   11/22/19 03:50


 


BP  119/74   11/22/19 03:50


 


Pulse Ox  95   11/22/19 03:50











Height: 5 ft 7 in


Weight: 80 kg


ASA Class: 3E


Mental Status: Alert & Oriented x3


Airway Class: Mallampati = 2


Dentition: Reports: Normal Dentition


Thyro-Mental Finger Breadths: 2


Mouth Opening Finger Breadths: 3


ROM/Head Extension: Full


Lungs: Clear to Auscultation, Normal Respiratory Effort


Cardiovascular: Regular Rate, Regular Rhythm, Murmurs (Systolic)





- Lab


Values: 





 Laboratory Last Values











WBC  17.15 K/uL (4.0-11.0)  H  11/21/19  22:20    


 


RBC  4.67 M/uL (4.50-5.90)   11/21/19  22:20    


 


Hgb  15.5 g/dL (13.0-17.0)   11/21/19  22:20    


 


Hct  44.3 % (38.0-50.0)   11/21/19  22:20    


 


MCV  94.9 fL (80.0-98.0)   11/21/19  22:20    


 


MCH  33.2 pg (27.0-32.0)  H  11/21/19  22:20    


 


MCHC  35.0 g/dL (31.0-37.0)   11/21/19  22:20    


 


RDW Std Deviation  45.1 fl (28.0-62.0)   11/21/19  22:20    


 


RDW Coeff of Dior  13 % (11.0-15.0)   11/21/19  22:20    


 


Plt Count  158 K/uL (150-400)   11/21/19  22:20    


 


MPV  10.00 fL (7.40-12.00)   11/21/19  22:20    


 


Neut % (Auto)  78.0 % (48.0-80.0)   11/21/19  22:20    


 


Lymph % (Auto)  9.4 % (16.0-40.0)  L  11/21/19  22:20    


 


Mono % (Auto)  11.7 % (0.0-15.0)   11/21/19  22:20    


 


Eos % (Auto)  0.8 % (0.0-7.0)   11/21/19  22:20    


 


Baso % (Auto)  0.1 % (0.0-1.5)   11/21/19  22:20    


 


Neut # (Auto)  13.4 K/uL (1.4-5.7)  H  11/21/19  22:20    


 


Lymph # (Auto)  1.6 K/uL (0.6-2.4)   11/21/19  22:20    


 


Mono # (Auto)  2.0 K/uL (0.0-0.8)  H  11/21/19  22:20    


 


Eos # (Auto)  0.1 K/uL (0.0-0.7)   11/21/19  22:20    


 


Baso # (Auto)  0.0 K/uL (0.0-0.1)   11/21/19  22:20    


 


Nucleated RBC %  0.0 /100WBC  11/21/19  22:20    


 


Nucleated RBCs #  0 K/uL  11/21/19  22:20    


 


Sodium  137 mmol/L (136-148)   11/21/19  22:20    


 


Potassium  3.5 mmol/L (3.5-5.1)   11/21/19  22:20    


 


Chloride  101 mmol/L ()   11/21/19  22:20    


 


Carbon Dioxide  28.0 mmol/L (21.0-32.0)   11/21/19  22:20    


 


BUN  9 mg/dL (7.0-18.0)   11/21/19  22:20    


 


Creatinine  1.1 mg/dL (0.8-1.3)   11/21/19  22:20    


 


Est Cr Clr Drug Dosing  66.77 mL/min  11/21/19  22:20    


 


Estimated GFR (MDRD)  > 60.0 ml/min  11/21/19  22:20    


 


Glucose  116 mg/dL ()  H  11/21/19  22:20    


 


Calcium  8.8 mg/dL (8.5-10.1)   11/21/19  22:20    


 


Total Bilirubin  1.4 mg/dL (0.2-1.0)  H  11/21/19  22:20    


 


AST  20 IU/L (15-37)   11/21/19  22:20    


 


ALT  42 IU/L (14-63)   11/21/19  22:20    


 


Alkaline Phosphatase  63 U/L ()   11/21/19  22:20    


 


Troponin I  < 0.050 ng/mL (0.000-0.056)   11/21/19  22:20    


 


B-Natriuretic Peptide  59 PG/ML (<100)   11/21/19  22:20    


 


Total Protein  7.5 g/dL (6.4-8.2)   11/21/19  22:20    


 


Albumin  4.0 g/dL (3.4-5.0)   11/21/19  22:20    


 


Globulin  3.5 g/dL (2.6-4.0)   11/21/19  22:20    


 


Albumin/Globulin Ratio  1.1  (0.9-1.6)   11/21/19  22:20    


 


Lipase  101 U/L ()   11/21/19  22:20    


 


Urine Color  YELLOW   11/21/19  22:25    


 


Urine Appearance  CLEAR   11/21/19  22:25    


 


Urine pH  7.0  (5.0-8.0)   11/21/19  22:25    


 


Ur Specific Gravity  1.010  (1.001-1.035)   11/21/19  22:25    


 


Urine Protein  NEGATIVE mg/dL (NEGATIVE)   11/21/19  22:25    


 


Urine Glucose (UA)  NEGATIVE mg/dL (NEGATIVE)   11/21/19  22:25    


 


Urine Ketones  NEGATIVE mg/dL (NEGATIVE)   11/21/19  22:25    


 


Urine Occult Blood  TRACE-INTACT  (NEGATIVE)  H  11/21/19  22:25    


 


Urine Nitrite  NEGATIVE  (NEGATIVE)   11/21/19  22:25    


 


Urine Bilirubin  NEGATIVE  (NEGATIVE)   11/21/19  22:25    


 


Urine Urobilinogen  1.0 EU/dL (<2.0)   11/21/19  22:25    


 


Ur Leukocyte Esterase  NEGATIVE  (NEGATIVE)   11/21/19  22:25    


 


Urine RBC  0-2  (0-2/HPF)   11/21/19  22:25    


 


Urine WBC  0-1  (0-5/HPF)   11/21/19  22:25    


 


Ur Epithelial Cells  RARE  (NONE-FEW)   11/21/19  22:25    


 


Urine Bacteria  RARE  (NEGATIVE)   11/21/19  22:25    














- Allergies


Allergies/Adverse Reactions: 


 Allergies











Allergy/AdvReac Type Severity Reaction Status Date / Time


 


No Known Allergies Allergy   Verified 11/22/19 03:12














- Anesthesia Plan


Free Text/Narrative:: 


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  States his functional level is quiet 

high without limitation.  Previous visits to the clinic this year show fatigue 

and lack of energy attributed to his CHF.  Stress test earlier this year was 

negative for acute ischemia.  Spoke with Dr Gil and recommended hospitalist 

consult post-op medical management.





- Acknowledgements


Anesthesia Type Planned: General Anesthesia


Pt an Appropriate Candidate for the Planned Anesthesia: Yes


Alternatives and Risks of Anesthesia Discussed w Pt/Guardian: Yes


Pt/Guardian Understands and Agrees with Anesthesia Plan: Yes





PreAnesthesia Questionnaire


HEENT History: Reports: None


Cardiovascular History: Reports: Bypass, Cardiomyopathy, Heart Failure (EF 35-40

%, apex,base,inferior walls severe hypokinesia/akinesia 5/19 BNP-59 11/19), 

Hypertension


Other Cardiovascular History: Patient states he has heart disease


Respiratory History: Reports: None


Gastrointestinal History: Reports: GERD, Hiatal Hernia (Confirmed on CXR 11/19)


Genitourinary History: Reports: None


Other Genitourinary History: Patient states he has "prostate issues."


Musculoskeletal History: Reports: None


Neurological History: Reports: None


Psychiatric History: Reports: None


Endocrine/Metabolic History: Reports: None


Hematologic History: Reports: None


Immunologic History: Reports: None


Oncologic (Cancer) History: Reports: None


Dermatologic History: Reports: None





- Infectious Disease History


Infectious Disease History: Reports: Chicken Pox





- Past Surgical History


Head Surgeries/Procedures: Reports: None


Cardiovascular Surgical History: Reports: Coronary Artery Bypass (x5 2009)


Other Cardiovascular Surgeries/Procedures: Angio 2009 without stent placement





- SUBSTANCE USE


Smoking Status *Q: Former Smoker


Tobacco Use Within Last Twelve Months: Cigarettes


Recreational Drug Use History: No





- HOME MEDS


Home Medications: 


 Home Meds





Aspirin 81 mg PO DAILY 09/15/19 [History]


Hydrochlorothiazide [Microzide] 12.5 mg PO DAILY 09/15/19 [History]


Ramipril [Altace] 10 mg PO DAILY 09/15/19 [History]


carvediloL [Coreg] 12.5 mg PO BID 09/15/19 [History]


Finasteride 5 mg PO DAILY 11/21/19 [History]


Omeprazole Magnesium [Prilosec] 10 mg PO DAILY 11/21/19 [History]


Pravastatin [Pravachol] 80 mg PO DAILY 11/21/19 [History]


Multivitamin [One Daily Multivitamin] 1 tab PO DAILY 11/22/19 [History]


Ubidecarenone [Ultra Coq10]  11/22/19 [History]











- CURRENT (IN HOUSE) MEDS


Current Meds: 





 Current Medications





Albuterol (Proventil Neb Soln)  2.5 mg NEB ONETIME PRN


   PRN Reason: Wheezing


Atropine Sulfate (Atropine 0.1 Mg/Ml)  0.5 mg IVPUSH ASDIRECTED PRN


   PRN Reason: Hypo-perfusion


Atropine Sulfate (Atropine 0.1 Mg/Ml)  1 mg IVPUSH ASDIRECTED PRN


   PRN Reason: Hypo-Perfusion


Dextrose/Water (Dextrose 50% In Water)  50 ml IVPUSH ASDIRECTED PRN


   PRN Reason: Hypoglycemia


Epinephrine HCl (Epinephrine 1:10,000)  1 mg IVPUSH ASDIRECTED PRN


   PRN Reason: ACLS Guidelines


Fentanyl (Sublimaze)  50 - 100 mcg IVPUSH Q5M PRN


   PRN Reason: Pain


Lactated Ringer's (Ringers, Lactated)  1,000 mls @ 125 mls/hr IV ASDIRECTED SIVAKUMAR


   Last Admin: 11/22/19 00:52 Dose:  125 mls/hr


Naloxone HCl (Narcan)  0.1 mg IVPUSH ASDIRECTED PRN


   PRN Reason: Respiratory Depression





Discontinued Medications





Cefoxitin Sodium (Mefoxin) Confirm Administered Dose 1 gm .ROUTE .STK-MED ONE


   Stop: 11/22/19 04:24


Ephedrine Sulfate (Ephedrine Sulfate) Confirm Administered Dose 50 mg .ROUTE 

.STK-MED ONE


   Stop: 11/22/19 04:28


Etomidate (Amidate) Confirm Administered Dose 40 mg IVPUSH .STK-MED ONE


   Stop: 11/22/19 03:05


Fentanyl (Sublimaze) Confirm Administered Dose 250 mcg .ROUTE .STK-MED ONE


   Stop: 11/22/19 03:05


Hydromorphone HCl (Dilaudid) Confirm Administered Dose 2 mg .ROUTE .STK-MED ONE


   Stop: 11/22/19 05:32


Sodium Chloride (Normal Saline)  1,000 mls @ 999 mls/hr IV STAT ONE


   Stop: 11/21/19 23:11


   Last Admin: 11/21/19 22:31 Dose:  999 mls/hr


Ceftriaxone Sodium/Dextrose 1 (gm/ Premix)  50 mls @ 100 mls/hr IV ONETIME ONE


   Stop: 11/22/19 00:41


   Last Admin: 11/22/19 00:22 Dose:  100 mls/hr


Bupivacaine HCl/Epinephrine Bitart (Sensorc Mpf 0.25%-Epi [id number]) Confirm 

Administered Dose 30 mls @ as directed .ROUTE .STK-MED ONE


   Stop: 11/22/19 03:31


Sodium Chloride (Normal Saline) Confirm Administered Dose 20 mls @ as directed 

.ROUTE .STK-MED ONE


   Stop: 11/22/19 04:24


Iopamidol (Isovue Multipack-370 (76%))  100 ml IVPUSH ONETIME STA


   Stop: 11/21/19 23:17


   Last Admin: 11/21/19 23:17 Dose:  100 ml


Lidocaine (Xylocaine-Mpf 2%) Confirm Administered Dose 5 ml .ROUTE .STK-MED ONE


   Stop: 11/22/19 03:05


Midazolam HCl (Versed 1 Mg/Ml) Confirm Administered Dose 2 mg .ROUTE .STK-MED 

ONE


   Stop: 11/22/19 03:05


Morphine Sulfate (Morphine)  2 mg IVPUSH ONETIME ONE


   Stop: 11/21/19 22:42


   Last Admin: 11/21/19 22:49 Dose:  2 mg


Ondansetron HCl (Zofran)  8 mg IVPUSH ONETIME ONE


   Stop: 11/21/19 22:12


   Last Admin: 11/21/19 22:31 Dose:  8 mg


Ondansetron HCl (Zofran) Confirm Administered Dose 4 mg .ROUTE .STK-MED ONE


   Stop: 11/22/19 03:05


Rocuronium Bromide (Zemuron) Confirm Administered Dose 100 mg .ROUTE .STK-MED 

ONE


   Stop: 11/22/19 03:05


Succinylcholine Chloride (Succinylcholine Chloride) Confirm Administered Dose 

200 mg .ROUTE .STK-MED ONE


   Stop: 11/22/19 03:05

## 2019-11-22 NOTE — PCM.OPNOTE
- General Post-Op/Procedure Note


Date of Surgery/Procedure: 11/22/19


Operative Procedure(s): lap appy


Findings: 


appendicitis suppurativa; gross perf not observed, surgicell/drain inserted; 

[id number]


Pre Op Diagnosis: acute appendicitis


Post-Op Diagnosis: Same


Anesthesia Technique: General ET Tube


Primary Surgeon: Huan Gil


Pathology: 





sent


Surgical Drain/Tube Type: [place] Drain


Complications: None


Condition: Fair


Free Text/Narrative:: 


 Intake & Output











 11/21/19 11/21/19 11/22/19





 14:59 22:59 06:59


 


Intake Total   385


 


Output Total   0


 


Balance   385

## 2019-11-23 RX ADMIN — OXYCODONE HYDROCHLORIDE AND ACETAMINOPHEN PRN TAB: 10; 325 TABLET ORAL at 06:46

## 2019-11-23 RX ADMIN — ONDANSETRON PRN MG: 2 INJECTION, SOLUTION INTRAMUSCULAR; INTRAVENOUS at 08:17

## 2019-11-23 NOTE — PCM.DCSUM1
**Discharge Summary





- Hospital Course


Free Text/Narrative:: 


see admission h/p for details; pt presented to ed w 48 hrs of abd pain; ct > 

acute appendicitis, surgery was consulted


Diagnosis: Stroke: No





- Discharge Data


Discharge Date: 11/23/19


Discharge Disposition: Home, Self-Care 01


Condition: Fair





- Referral to Home Health


Primary Care Physician: 


Kiara Brennan MD








- Patient Summary/Data


Operative Procedure(s) Performed: lap appy


Consults: 


 Consultations





11/22/19 05:57


Consult to Physician [CONS] Routine 











Hospital Course: 





pt was taken to surgery s/p lap appendectomy; po pt was put to telemetry for 

his card hx; hospitalist was consulted for med management; pt did well postop, 

no complaint; abd soft, wound sites CDI, amb by self, muriel po intake, pt was 

discharged home w script for pain meds; pt would have tigist removed next wk.





- Patient Instructions


Diet: Regular Diet as Tolerated


Activity: No Lifting Over 10 Pounds, No Strenuous Activities


Driving: Do Not Drive


Showering/Bathing: May Shower in 3 Days


Wound/Incision Care: Keep Operative Site/Wound Site Clean and Dry


Notify Provider of: Fever, Drainage, Nausea and/or Vomiting





- Discharge Plan


Home Medications: 


 Home Meds





Aspirin 81 mg PO DAILY 09/15/19 [History]


Hydrochlorothiazide [Microzide] 12.5 mg PO DAILY 09/15/19 [History]


Ramipril [Altace] 10 mg PO DAILY 09/15/19 [History]


carvediloL [Coreg] 12.5 mg PO BID 09/15/19 [History]


Finasteride 5 mg PO DAILY 11/21/19 [History]


Pravastatin [Pravachol] 80 mg PO DAILY 11/21/19 [History]


Multivitamin [One Daily Multivitamin] 1 tab PO DAILY 11/22/19 [History]


Omeprazole Magnesium [Prilosec Otc] 20 mg PO DAILY 11/22/19 [History]


Ubidecarenone [Ultra Coq10]  11/22/19 [History]








Patient Handouts:  Docusate Sodium; Senna tablets or capsules, Acetaminophen; 

Oxycodone tablets, Laparoscopic Appendectomy, Adult, Care After, Easy-to-Read, 

Surgical Drain Home Care


Referrals: 


Dakota Villalta MD [Physician] - 11/27/19 3:30 pm


Huan Gil MD [Physician] - 


Kiara Brennan MD [Primary Care Provider] - 





- Discharge Summary/Plan Comment


DC Time >30 min.: Yes





- Patient Data


Vitals - Most Recent: 


 Last Vital Signs











Temp  97.8 F   11/23/19 12:45


 


Pulse  69   11/23/19 12:45


 


Resp  18   11/23/19 12:45


 


BP  115/73   11/23/19 12:45


 


Pulse Ox  91 L  11/23/19 12:45











Weight - Most Recent: 179 lb 3.773 oz


I&O - Last 24 hours: 


 Intake & Output











 11/22/19 11/23/19 11/23/19





 22:59 06:59 14:59


 


Intake Total 840 1873 


 


Output Total 120 110 


 


Balance 720 1763 











Med Orders - Current: 


 Current Medications





Aspirin (Aspirin)  81 mg PO DAILY [facility]


   Last Admin: 11/23/19 08:12 Dose:  81 mg


Carvedilol (Coreg)  12.5 mg PO BID [facility]


   Last Admin: 11/23/19 08:12 Dose:  12.5 mg


Docusate Sodium (Colace)  100 mg PO DAILY [facility]


   Last Admin: 11/23/19 08:12 Dose:  100 mg


Finasteride (Proscar)  5 mg PO DAILY [facility]


   Last Admin: 11/23/19 08:12 Dose:  5 mg


Hydrochlorothiazide (Hydrochlorothiazide)  12.5 mg PO DAILY [facility]


   Last Admin: 11/23/19 08:12 Dose:  12.5 mg


Lactated Ringer's (Ringers, Lactated)  1,000 mls @ 75 mls/hr IV ASDIRECTED [facility]


   Last Admin: 11/22/19 11:41 Dose:  100 mls/hr


Morphine Sulfate (Morphine)  3 mg IVPUSH Q6H PRN


   PRN Reason: Breakthrough Pain


   Last Admin: 11/22/19 10:18 Dose:  3 mg


Ondansetron HCl (Zofran)  4 mg IVPUSH Q8H PRN


   PRN Reason: Nausea/Vomiting


   Last Admin: 11/23/19 08:17 Dose:  4 mg


Oxycodone/Acetaminophen (Percocet 325-10 Mg)  1 tab PO Q6H PRN


   PRN Reason: Abdominal Pain


   Last Admin: 11/23/19 06:46 Dose:  1 tab


Pravastatin Sodium (Pravachol)  80 mg PO DAILY [facility]


   Last Admin: 11/23/19 08:12 Dose:  80 mg


Ramipril (Altace)  10 mg PO DAILY [facility]


   Last Admin: 11/23/19 08:11 Dose:  10 mg





Discontinued Medications





Albuterol (Proventil Neb Soln)  2.5 mg NEB ONETIME PRN


   PRN Reason: Wheezing


Atropine Sulfate (Atropine 0.1 Mg/Ml)  0.5 mg IVPUSH ASDIRECTED PRN


   PRN Reason: Hypo-perfusion


Atropine Sulfate (Atropine 0.1 Mg/Ml)  1 mg IVPUSH ASDIRECTED PRN


   PRN Reason: Hypo-Perfusion


Cefoxitin Sodium (Mefoxin) Confirm Administered Dose 1 gm .ROUTE .STK-MED ONE


   Stop: 11/22/19 04:24


Dextrose/Water (Dextrose 50% In Water)  50 ml IVPUSH ASDIRECTED PRN


   PRN Reason: Hypoglycemia


Ephedrine Sulfate (Ephedrine Sulfate) Confirm Administered Dose 50 mg .ROUTE 

.STK-MED ONE


   Stop: 11/22/19 04:28


Epinephrine HCl (Epinephrine 1:10,000)  1 mg IVPUSH ASDIRECTED PRN


   PRN Reason: ACLS Guidelines


Etomidate (Amidate) Confirm Administered Dose 40 mg IVPUSH .STK-MED ONE


   Stop: 11/22/19 03:05


Fentanyl (Sublimaze) Confirm Administered Dose 250 mcg .ROUTE .STK-MED ONE


   Stop: 11/22/19 03:05


Fentanyl (Sublimaze)  50 - 100 mcg IVPUSH Q5M PRN


   PRN Reason: Pain


Hydromorphone HCl (Dilaudid) Confirm Administered Dose 2 mg .ROUTE .STK-MED ONE


   Stop: 11/22/19 05:32


Sodium Chloride (Normal Saline)  1,000 mls @ 999 mls/hr IV STAT ONE


   Stop: 11/21/19 23:11


   Last Admin: 11/21/19 22:31 Dose:  999 mls/hr


Ceftriaxone Sodium/Dextrose 1 (gm/ Premix)  50 mls @ 100 mls/hr IV ONETIME ONE


   Stop: 11/22/19 00:41


   Last Admin: 11/22/19 00:22 Dose:  100 mls/hr


Lactated Ringer's (Ringers, Lactated)  1,000 mls @ 125 mls/hr IV ASDIRECTED [facility]


   Last Admin: 11/22/19 00:52 Dose:  125 mls/hr


Bupivacaine HCl/Epinephrine Bitart (Sensorc Mpf 0.25%-Epi 1:[id number]) Confirm 

Administered Dose 30 mls @ as directed .ROUTE .STK-MED ONE


   Stop: 11/22/19 03:31


Sodium Chloride (Normal Saline) Confirm Administered Dose 20 mls @ as directed 

.ROUTE .STK-MED ONE


   Stop: 11/22/19 04:24


Iopamidol (Isovue Multipack-370 (76%))  100 ml IVPUSH ONETIME STA


   Stop: 11/21/19 23:17


   Last Admin: 11/21/19 23:17 Dose:  100 ml


Lidocaine (Xylocaine-Mpf 2%) Confirm Administered Dose 5 ml .ROUTE .STK-MED ONE


   Stop: 11/22/19 03:05


Midazolam HCl (Versed 1 Mg/Ml) Confirm Administered Dose 2 mg .ROUTE .STK-MED 

ONE


   Stop: 11/22/19 03:05


Morphine Sulfate (Morphine)  2 mg IVPUSH ONETIME ONE


   Stop: 11/21/19 22:42


   Last Admin: 11/21/19 22:49 Dose:  2 mg


Morphine Sulfate (Morphine)  3 mg IVPUSH Q6H PRN


   PRN Reason: Breakthrough Pain


Naloxone HCl (Narcan)  0.1 mg IVPUSH ASDIRECTED PRN


   PRN Reason: Respiratory Depression


Ondansetron HCl (Zofran)  8 mg IVPUSH ONETIME ONE


   Stop: 11/21/19 22:12


   Last Admin: 11/21/19 22:31 Dose:  8 mg


Ondansetron HCl (Zofran) Confirm Administered Dose 4 mg .ROUTE .STK-MED ONE


   Stop: 11/22/19 03:05


Oxycodone/Acetaminophen (Percocet 325-5 Mg)  1 tab PO Q6H PRN


   PRN Reason: Pain


   Last Admin: 11/22/19 08:10 Dose:  1 tab


Oxycodone/Acetaminophen (Percocet 325-7.5 Mg)  1 tab PO Q6H PRN


   PRN Reason: Abdominal Pain


Rocuronium Bromide (Zemuron) Confirm Administered Dose 100 mg .ROUTE .STK-MED 

ONE


   Stop: 11/22/19 03:05


Succinylcholine Chloride (Succinylcholine Chloride) Confirm Administered Dose 

200 mg .ROUTE .STK-MED ONE


   Stop: 11/22/19 03:05

## 2019-12-01 ENCOUNTER — HOSPITAL ENCOUNTER (EMERGENCY)
Dept: HOSPITAL 56 - MW.ED | Age: 59
Discharge: HOME | End: 2019-12-01
Payer: COMMERCIAL

## 2019-12-01 DIAGNOSIS — K21.9: ICD-10-CM

## 2019-12-01 DIAGNOSIS — Z79.82: ICD-10-CM

## 2019-12-01 DIAGNOSIS — I11.0: ICD-10-CM

## 2019-12-01 DIAGNOSIS — M25.561: Primary | ICD-10-CM

## 2019-12-01 DIAGNOSIS — I50.9: ICD-10-CM

## 2019-12-01 DIAGNOSIS — Z79.899: ICD-10-CM

## 2019-12-01 LAB
BUN SERPL-MCNC: 9 MG/DL (ref 7–18)
CHLORIDE SERPL-SCNC: 105 MMOL/L (ref 98–107)
CO2 SERPL-SCNC: 31.9 MMOL/L (ref 21–32)
GLUCOSE SERPL-MCNC: 97 MG/DL (ref 74–106)
POTASSIUM SERPL-SCNC: 4.6 MMOL/L (ref 3.5–5.1)
SODIUM SERPL-SCNC: 141 MMOL/L (ref 136–148)

## 2019-12-01 NOTE — CR
INDICATION:



Knee pain.



FINDINGS:



Three views of the right knee show no evidence of acute fracture or 

dislocation. No other bony or soft tissue abnormalities identified.



Dictated by Salazar Wyatt MD @ 12/1/2019 11:05:51 AM



Dictated by: Salazar Wyatt MD @ 12/01/2019 11:06:00



(Electronically Signed)

## 2019-12-01 NOTE — US
CLINICAL HISTORY:



 Right knee pain 



TECHNIQUE:



A compression venous ultrasound exam was performed of the right lower 

extremity using gray-scale imaging, color Doppler and spectral Doppler 

analysis.



FINDINGS:



Sonographic imaging of the right lower extremity demonstrates normal 

compressibility and color Doppler venous blood flow within the common 

femoral vein, deep femoral vein, and the proximal greater saphenous vein. 

Within the thigh,  the femoral vein is patent and compressible. At a lower 

level, the popliteal and posterior tibial veins also show normal 

compressibility and color Doppler venous blood flow.



Slightly slow flow within the popliteal vein. 



IMPRESSION:



No evidence of deep vein thrombosis within the right lower extremity.



Dictated by Jade Wyatt MD @ Dec  1 2019 11:05AM



Signed by Dr. Jade Wyatt @ Dec  1 2019 11:07AM

## 2019-12-01 NOTE — EDM.PDOC
ED HPI GENERAL MEDICAL PROBLEM





- General


Chief Complaint: Lower Extremity Injury/Pain


Stated Complaint: CAN NOT BEND RT KNEE


Time Seen by Provider: 12/01/19 10:03


Source of Information: Reports: Patient


History Limitations: Reports: No Limitations





- History of Present Illness


INITIAL COMMENTS - FREE TEXT/NARRATIVE: 


HISTORY AND PHYSICAL:





History of present illness:


Patient is a 59-year-old male who presents to the emergency room today with 

complaints of right knee discomfort and soft tissue swelling. He states last 

week he had an appendectomy and had been feeling well. Over the past 24 hours 

he noticed that his right knee is "locked" stating he is unable to bend. He is 

concerned that this may be a postoperative complication as he does not recall 

any injury, trauma or falls. He does have some pain with palpation over the 

medial patella. He is able to bear weight, has not had any areas of redness, 

calf pain or foot drop. Denies any numbness or tingling of the affected 

extremity.





Review of systems: 


As per history of present illness and below otherwise all systems reviewed and 

negative.





Past medical history: 


As per history of present illness and as reviewed below otherwise 

noncontributory.





Surgical history: 


As per history of present illness and as reviewed below otherwise 

noncontributory.





Social history: 


See social history for further information





Family history: 


As per history of present illness and as reviewed below otherwise 

noncontributory.





Physical exam:


General: Well-developed and well-nourished 59-year-old male. Alert and 

oriented. Nontoxic appearing and in no acute distress.


HEENT: Atraumatic, normocephalic, pupils equal and reactive bilaterally, 

negative for conjunctival pallor or scleral icterus, mucous membranes moist, 

trachea midline. No drooling or trismus noted. No meningeal signs. No hot 

potato voice noted. 


Lungs: Clear to auscultation, breath sounds equal bilaterally, chest nontender.


Heart: S1S2, regular rate and rhythm without overt murmur


Abdomen: Soft, nondistended, nontender. Negative for masses or 

hepatosplenomegaly. Negative for costovertebral tenderness.


Pelvis: Stable nontender.


Skin: Intact, warm, dry. No lesions or rashes noted.


Extremities: Atraumatic, moves all extremities per self without difficulty or 

deficits, negative for cords or calf pain. Able to manually bend the knee with 

moderate force, some resistance noted unsure if this is from the patient. Pedal 

pulse. Positive CMS Neurovascular unremarkable.


C-spine/Back: No pinpoint vertebral tenderness upon palpation. No crepitus, step

-offs or obvious deformities. Patient is ambulatory into the emergency room 

without difficulty or deficit. Able to rock back on heels and walk on toes. 

Denies any urinary or fecal incontinence. Denies any numbness, tingling or 

saddle paresthesia. 


Neuro: Awake, alert, oriented. Cranial nerves II through XII unremarkable. 

Cerebellum unremarkable. Motor and sensory unremarkable throughout. Exam 

nonfocal.





Notes:


Diagnostics are unremarkable. This information was shared with the patient. I 

did offer him a knee immobilizer and crutches which he declines. Encouraged him 

to follow-up with the orthopedic provider, call Monday. Supportive care 

measures were reviewed and discussed. Voices understanding and is agreeable to 

plan of care. Denies any further questions or concerns at this time.





Diagnostics:


CBC, CMP, Lactic, Venous, Xray





Therapeutics:


Declines





Prescription:


Declines





Impression: 


Right knee pain





Plan:


1. Rest, ice, elevate the affected extremity. 


2. Tylenol and/or Ibuprofen as needed for pain management. 


3. Follow up with the Orthopedic provider as we discussed. Return to the ED as 

needed and as discussed.





Definitive disposition and diagnosis as appropriate pending reevaluation and 

review of above.








- Related Data


 Allergies











Allergy/AdvReac Type Severity Reaction Status Date / Time


 


No Known Allergies Allergy   Verified 11/22/19 03:12











Home Meds: 


 Home Meds





Aspirin 81 mg PO DAILY 09/15/19 [History]


Hydrochlorothiazide [Microzide] 12.5 mg PO DAILY 09/15/19 [History]


Ramipril [Altace] 10 mg PO DAILY 09/15/19 [History]


carvediloL [Coreg] 12.5 mg PO BID 09/15/19 [History]


Finasteride 5 mg PO DAILY 11/21/19 [History]


Pravastatin [Pravachol] 80 mg PO DAILY 11/21/19 [History]


Multivitamin [One Daily Multivitamin] 1 tab PO DAILY 11/22/19 [History]


Omeprazole Magnesium [Prilosec Otc] 20 mg PO DAILY 11/22/19 [History]


Ubidecarenone [Ultra Coq10]  11/22/19 [History]











Past Medical History


HEENT History: Reports: None


Cardiovascular History: Reports: Bypass, Cardiomyopathy, Heart Failure, 

Hypertension


Other Cardiovascular History: Patient states he has heart disease


Respiratory History: Reports: None


Gastrointestinal History: Reports: GERD, Hiatal Hernia


Genitourinary History: Reports: None


Other Genitourinary History: Patient states he has "prostate issues."


Musculoskeletal History: Reports: None


Neurological History: Reports: None


Psychiatric History: Reports: None


Endocrine/Metabolic History: Reports: None


Insulin Pump Model and : None


Hematologic History: Reports: None


Immunologic History: Reports: None


Oncologic (Cancer) History: Reports: None


Dermatologic History: Reports: None





- Infectious Disease History


Infectious Disease History: Reports: Chicken Pox





- Past Surgical History


Head Surgeries/Procedures: Reports: None


Cardiovascular Surgical History: Reports: Coronary Artery Bypass


Other Cardiovascular Surgeries/Procedures: Angio 2009 without stent placement





Social & Family History





- Family History


Family Medical History: Noncontributory





- Tobacco Use


Smoking Status *Q: Never Smoker





- Caffeine Use


Caffeine Use: Reports: None





- Recreational Drug Use


Recreational Drug Use: No





Review of Systems





- Review of Systems


Review Of Systems: Comprehensive ROS is negative, except as noted in HPI.





ED EXAM, GENERAL





- Physical Exam


Exam: See Below (See dictation)





Course





- Vital Signs


Last Recorded V/S: 


 Last Vital Signs











Temp  97 F   12/01/19 10:02


 


Pulse  75   12/01/19 10:02


 


Resp  18   12/01/19 10:02


 


BP  120/84   12/01/19 10:02


 


Pulse Ox  97   12/01/19 10:02














- Orders/Labs/Meds


Orders: 


 Active Orders 24 hr











 Category Date Time Status


 


 DME for Discharge [COMM] Stat Oth  12/01/19 11:18 Ordered











Labs: 


 Laboratory Tests











  12/01/19 12/01/19 12/01/19 Range/Units





  10:56 10:56 10:56 


 


WBC  9.52    (4.0-11.0)  K/uL


 


RBC  4.51    (4.50-5.90)  M/uL


 


Hgb  14.5    (13.0-17.0)  g/dL


 


Hct  43.6    (38.0-50.0)  %


 


MCV  96.7    (80.0-98.0)  fL


 


MCH  32.2 H    (27.0-32.0)  pg


 


MCHC  33.3    (31.0-37.0)  g/dL


 


RDW Std Deviation  46.9    (28.0-62.0)  fl


 


RDW Coeff of Dior  13    (11.0-15.0)  %


 


Plt Count  241    (150-400)  K/uL


 


MPV  9.20    (7.40-12.00)  fL


 


Neut % (Auto)  64.0    (48.0-80.0)  %


 


Lymph % (Auto)  20.4    (16.0-40.0)  %


 


Mono % (Auto)  11.9    (0.0-15.0)  %


 


Eos % (Auto)  3.2    (0.0-7.0)  %


 


Baso % (Auto)  0.5    (0.0-1.5)  %


 


Neut # (Auto)  6.1 H    (1.4-5.7)  K/uL


 


Lymph # (Auto)  1.9    (0.6-2.4)  K/uL


 


Mono # (Auto)  1.1 H    (0.0-0.8)  K/uL


 


Eos # (Auto)  0.3    (0.0-0.7)  K/uL


 


Baso # (Auto)  0.1    (0.0-0.1)  K/uL


 


Nucleated RBC %  0.0    /100WBC


 


Nucleated RBCs #  0    K/uL


 


Lactate   1.2   (0.20-2.00)  mmol/L


 


Sodium    141  (136-148)  mmol/L


 


Potassium    4.6  (3.5-5.1)  mmol/L


 


Chloride    105  ()  mmol/L


 


Carbon Dioxide    31.9  (21.0-32.0)  mmol/L


 


BUN    9  (7.0-18.0)  mg/dL


 


Creatinine    1.1  (0.8-1.3)  mg/dL


 


Est Cr Clr Drug Dosing    66.77  mL/min


 


Estimated GFR (MDRD)    > 60.0  ml/min


 


Glucose    97  ()  mg/dL


 


Calcium    8.7  (8.5-10.1)  mg/dL


 


Total Bilirubin    0.7  (0.2-1.0)  mg/dL


 


AST    22  (15-37)  IU/L


 


ALT    51  (14-63)  IU/L


 


Alkaline Phosphatase    68  ()  U/L


 


Total Protein    7.5  (6.4-8.2)  g/dL


 


Albumin    3.7  (3.4-5.0)  g/dL


 


Globulin    3.8  (2.6-4.0)  g/dL


 


Albumin/Globulin Ratio    1.0  (0.9-1.6)  














Departure





- Departure


Time of Disposition: 11:16


Disposition: Home, Self-Care 01


Clinical Impression: 


Right knee pain


Qualifiers:


 Chronicity: acute Qualified Code(s): M25.561 - Pain in right knee








- Discharge Information


Instructions:  Knee Pain, Adult, Easy-to-Read


Referrals: 


Kiara Brennan MD [Primary Care Provider] - 


Forms:  ED Department Discharge


Additional Instructions: 


The following information is given to patients seen in the emergency department 

who are being discharged to home. This information is to outline your options 

for follow-up care. We provide all patients seen in our emergency department 

with a follow-up referral.





The need for follow-up, as well as the timing and circumstances, are variable 

depending upon the specifics of your emergency department visit.





If you don't have a primary care physician on staff, we will provide you with a 

referral. We always advise you to contact your personal physician following an 

emergency department visit to inform them of the circumstance of the visit and 

for follow-up with them and/or the need for any referrals to a consulting 

specialist.





The emergency department will also refer you to a specialist when appropriate. 

This referral assures that you have the opportunity for follow-up care with a 

specialist. All of these measure are taken in an effort to provide you with 

optimal care, which includes your follow-up.





Under all circumstances we always encourage you to contact your private 

physician who remains a resource for coordinating your care. When calling for 

follow-up care, please make the office aware that this follow-up is from your 

recent emergency room visit. If for any reason you are refused follow-up, 

please contact the [facility] Emergency 

Department at [phone] and asked to speak to the emergency department 

charge nurse.





[facility]


Primary Care


[address] [place] [id number]


Phone: [phone]


Fax: [phone]





[facility]


[address] [place] [address]


Phone: [phone]


Fax: [phone]





[facility]


Specialty Care - Orthopedic Clinic


[address]


[address] [place] [address], [address]


[place] [address]


Phone: [phone]





1. Rest, ice, elevate the affected extremity. 


2. Tylenol and/or Ibuprofen as needed for pain management. 


3. Follow up with the Orthopedic provider as we discussed. Return to the ED as 

needed and as discussed.





- My Orders


Last 24 Hours: 


My Active Orders





12/01/19 11:18


DME for Discharge [COMM] Stat 














- Assessment/Plan


Last 24 Hours: 


My Active Orders





12/01/19 11:18


DME for Discharge [COMM] Stat

## 2020-12-04 NOTE — [id number]
Post Anesthesia Note





- EVALUATION WITHIN 48HRS OF ANESTHETIC


Vital Signs in Normal Range: Yes


Patient Participated in Evaluation: Yes


Respiratory Function Stable: Yes


Airway Patent: Yes


Cardiovascular Function Stable: Yes


Hydration Status Stable: Yes


Pain Control Satisfactory: Yes


Nausea and Vomiting Control Satisfactory: Yes


Mental Status Recovered: Yes


Vital Signs: 


                                Last Vital Signs











Temp  97.9 F   12/04/20 07:04


 


Pulse  79   12/04/20 08:40


 


Resp  15   12/04/20 08:40


 


BP  97/67   12/04/20 08:40


 


Pulse Ox  95   12/04/20 08:40

## 2020-12-04 NOTE — PCM.OPNOTE
- General Post-Op/Procedure Note


Date of Surgery/Procedure: 12/04/20


Operative Procedure(s): egd w bx.  colonoscopy w snare


Findings: 





see [id number]


Pre Op Diagnosis: polyp hx and gerd


Post-Op Diagnosis: Same


Anesthesia Technique: Moderate Sedation


Primary Surgeon: Huan Gil


Pathology: 





egd bx


colon polyp at 80cm when withdrawal, size about 5 mm sessile


Complications: None


Condition: Good

## 2020-12-04 NOTE — PCM.POSTAN
POST ANESTHESIA ASSESSMENT





- MENTAL STATUS


Mental Status: Alert, Oriented





- VITAL SIGNS


Vital Signs: 


                                Last Vital Signs











Temp  97.9 F   12/04/20 07:04


 


Pulse  79   12/04/20 08:40


 


Resp  15   12/04/20 08:40


 


BP  97/67   12/04/20 08:40


 


Pulse Ox  95   12/04/20 08:40














- RESPIRATORY


Respiratory Status: Respiratory Rate WNL, Airway Patent, O2 Saturation Stable





- CARDIOVASCULAR


CV Status: Pulse Rate WNL, Blood Pressure Stable





- GASTROINTESTINAL


GI Status: No Symptoms





- POST OP HYDRATION


Hydration Status: Adequate & Stable

## 2020-12-04 NOTE — OR
SURGEON:

Huan Gil MD

 

DATE OF PROCEDURE:  12/04/2020

 

PREOPERATIVE DIAGNOSES:

History of polyp and gastroesophageal reflux disease.

 

POSTOPERATIVE DIAGNOSES:

Esophagogastroduodenoscopy diagnosis is esophagitis, colonoscopy diagnosis is

colon polyp.

 

PROCEDURES PERFORMED:

Esophagogastroduodenoscopy with biopsy and colonoscopy with snare.

 

DESCRIPTION OF PROCEDURE:

EGD:  The patient was taken to the endoscopy room, and with the CRNA, Diprivan

was administered.

 

A well-lubricated EGD scope was gently inserted through the oropharynx, down the

esophagus, passing through the gastroesophageal junction, into the stomach.  The

mucosa was examined upon the passage.  Any etiology will be noted.  Once in the

stomach, we continued to advance to the distal antrum, passed through the

pylorus into the second portion of the duodenum.  Again, the mucosa was examined

for any abnormality and etiology.

 

The scope was then retrieved back to the stomach and then retroflexed to look at

the fundus of the stomach.  If a biopsy was indicated, we will biopsy the

antrum, body, and gastroesophageal junction.  The air will be sucked out while

the scope is retrieved to reduce the patient's discomfort.

 

The patient tolerated the procedure well.  There were no intraoperative

complications.  Dr. Gil was present through the whole procedure.

 

Prior to surgery, a time-out had been called, the patient identified, procedure

identified and antibiotic administered.

 

The patient was taken to the endoscopy room.  A time out was called, patient

identified, and procedure identified.  Diprivan was then administrated.  Patient

went from awake to sleep, hearing doctor talking or door closing is normal.

Perineum inspection and digital examination were then performed.  A well-

lubricated colonoscope was gently inserted through the rectum, advanced past the

rectosigmoid junction, the descending colon, splenic flexure, transverse colon,

hepatic flexure, ascending colon, arrived to the cecum.  Cecum was identified as

dictated in the finding.  Then the scope was carefully withdrawn while attention

was paid to the mucosal surface for any abnormality.  Air will be sucked out

during the scope withdrawal.  At the rectum, retroflexed to examine any rectal

diseases, fistula or hemorrhoids.  During mucosal examination, abnormality or

polyp encountered.  Using snare equipment, the abnormality or the polyp was then

snared off using electrocautery.  The patient tolerated procedure well.  There

were no intraoperative complications, and Dr. Gil was present throughout the

whole procedure.

 

FINDINGS:

EGD findings:

1. The patient is easily sedated with CRNA and Diprivan, the patient is

    soundly snoring.

2. Oropharynx and proximal esophagus are free of disease.  No stricture,

    inflammation, or varicosity.  Distal GE junction at 40 shows flame-like

    salmon-colored change, consistent with moderate acid reflux and in some

    area looked like to have esophagitis.  Raúl ulcer or bleeding is not

    observed.  Stomach rugae are normal in appearance.  Antrum slightly

    inflamed and with some petechiae, erythema.  Again, raúl ulcer or bleeding

    is not observed.  Duodenum is grossly normal.  Retroflexed look at the

    fundus of the stomach, there is no hiatal hernia.  Biopsy done at antrum

    and GE junction at 40 and sucked out the gas while scope pulling out.

    During the whole study, there is no bile, no food particle, and no raúl

    blood or raúl ulcer observed.

 

Colonoscopy findings:

1. The patient is easily sedated with CRNA and Diprivan, the patient is

    soundly snoring.

2. Bowel prep is average to above average.  Some liquid stool, no semi-formed

    stool, no stool ball.

3. Colon is rather straightforward.  Cecum indicated by ileocecal fold, one-to-

    one indentation, appendiceal orifice.  ScopeGuide is pointing south.

    Mucosa examined upon scope pulling out with some irrigation, and the

    patient does not have diverticulosis.  The patient has a small polyp, about

    5 mm, at distance 80 while scope coming out.  Other than that, there is no

    other growth, inflammation, stricture, AV malformation, bleeding, none of

    those.  No additional polyp, just one we snared and captured.  The patient

    has internal hemorrhoids and external hemorrhoids, mild.  The patient will

    benefit from repeat colonoscopy, depends on the polyp pathology.  We will

    also put the patient on higher dose of PPI as the patient is taking 20 mg

    now.  With the patient's esophagitis, up his dose to 40 for 2 months, and

    probably will benefit from repeat EGD in 6 months.

 

 

TYLER / NAUN

DD:  12/04/2020 08:34:07

DT:  12/04/2020 10:37:29

Job #:  [id number]

## 2020-12-04 NOTE — PCM.PREANE
Preanesthetic Assessment





- Anesthesia/Transfusion/Family Hx


Anesthesia History: Prior Anesthesia Without Reaction


Family History of Anesthesia Reaction: No


Transfusion History: No Prior Transfusion(s)





- Review of Systems


General: No Symptoms


Pulmonary: No Symptoms


Cardiovascular: No Symptoms


Gastrointestinal: No Symptoms


Neurological: No Symptoms


Other: Reports: None





- Physical Assessment


NPO Status Date: 12/03/20


Vital Signs: 





                                Last Vital Signs











Temp  97.9 F   12/04/20 07:04


 


Pulse  70   12/04/20 07:04


 


Resp  16   12/04/20 07:04


 


BP  114/78   12/04/20 07:04


 


Pulse Ox  99   12/04/20 07:04











Height: 5 ft 7 in


Weight: 79.379 kg


ASA Class: 2


Airway Class: Mallampati = 2


Dentition: Reports: Normal Dentition


ROM/Head Extension: Full


Lungs: Clear to Auscultation, Normal Respiratory Effort


Cardiovascular: Regular Rate, Regular Rhythm





- Allergies


Allergies/Adverse Reactions: 


                                    Allergies











Allergy/AdvReac Type Severity Reaction Status Date / Time


 


No Known Allergies Allergy   Verified 11/30/20 08:36














- Blood


Blood Available: No





- Anesthesia Plan


Pre-Op Medication Ordered: None





- Acknowledgements


Anesthesia Type Planned: General Anesthesia (tiva)


Pt an Appropriate Candidate for the Planned Anesthesia: Yes


Alternatives and Risks of Anesthesia Discussed w Pt/Guardian: Yes


Pt/Guardian Understands and Agrees with Anesthesia Plan: Yes


Additional Comments: 





PMH: cad s/p cabg in 2009, ischemic cardiomyopathy with EF in 2019 of 40-45%





PreAnesthesia Questionnaire


HEENT History: Reports: None


Other HEENT History: wears glasses when drives


Cardiovascular History: Reports: Bypass, Cardiomyopathy, Heart Failure, 

Hypertension


Other Cardiovascular History: Patient states he has heart disease


Respiratory History: Reports: None


Gastrointestinal History: Reports: GERD, Hiatal Hernia


Genitourinary History: Reports: None


Other Genitourinary History: Patient states he has "prostate issues."


Musculoskeletal History: Reports: None


Neurological History: Reports: None


Psychiatric History: Reports: None


Endocrine/Metabolic History: Reports: None


Hematologic History: Reports: None


Immunologic History: Reports: None


Oncologic (Cancer) History: Reports: None


Dermatologic History: Reports: None





- Infectious Disease History


Infectious Disease History: Reports: Chicken Pox





- Past Surgical History


Head Surgeries/Procedures: Reports: None


HEENT Surgical History: Reports: Adenoidectomy, Tonsillectomy


Cardiovascular Surgical History: Reports: Coronary Artery Bypass


Other Cardiovascular Surgeries/Procedures: Angio 2009 without stent placement


GI Surgical History: Reports: Appendectomy, Colonoscopy


Female  Surgical History: Reports: None





- SUBSTANCE USE


Tobacco Use Status *Q: Former Tobacco User





- HOME MEDS


Home Medications: 


                                    Home Meds





Aspirin 81 mg PO DAILY 09/15/19 [History]


carvediloL [Coreg] 12.5 mg PO BID 09/15/19 [History]


hydroCHLOROthiazide [Microzide] 12.5 mg PO DAILY 09/15/19 [History]


ramipriL [Altace] 10 mg PO DAILY 09/15/19 [History]


Finasteride 5 mg PO DAILY 11/21/19 [History]


Pravastatin [Pravachol] 80 mg PO DAILY 11/21/19 [History]


Ubidecarenone [Ultra Coq10] 1 tab PO DAILY 11/22/19 [History]











- CURRENT (IN HOUSE) MEDS


Current Meds: 





                               Current Medications





Lactated Ringer's (Ringers, Lactated)  1,000 mls @ 125 mls/hr IV ASDIRECTED [facility]


   Last Admin: 12/04/20 06:50 Dose:  125 mls/hr


   Documented by: 





Discontinued Medications





Glycopyrrolate (Robinul) Confirm Administered Dose 0.2 mg .ROUTE .STK-MED ONE


   Stop: 12/04/20 07:15


Lidocaine (Xylocaine-Mpf 2%) Confirm Administered Dose 5 ml .ROUTE .STK-MED ONE


   Stop: 12/04/20 07:15


Midazolam HCl (Versed 1 Mg/Ml) Confirm Administered Dose 2 mg .ROUTE .STK-MED 

ONE


   Stop: 12/04/20 07:15


Propofol (Diprivan  20 Ml) Confirm Administered Dose 600 mg .ROUTE .STK-MED ONE


   Stop: 12/04/20 07:15